# Patient Record
Sex: FEMALE | Race: OTHER
[De-identification: names, ages, dates, MRNs, and addresses within clinical notes are randomized per-mention and may not be internally consistent; named-entity substitution may affect disease eponyms.]

---

## 2019-09-30 ENCOUNTER — HOSPITAL ENCOUNTER (EMERGENCY)
Dept: HOSPITAL 74 - JER | Age: 64
LOS: 1 days | Discharge: HOME | End: 2019-10-01
Payer: SELF-PAY

## 2019-09-30 VITALS — BODY MASS INDEX: 21.5 KG/M2

## 2019-09-30 VITALS — TEMPERATURE: 97.3 F

## 2019-09-30 DIAGNOSIS — Z86.73: ICD-10-CM

## 2019-09-30 DIAGNOSIS — E04.9: ICD-10-CM

## 2019-09-30 DIAGNOSIS — T78.1XXA: Primary | ICD-10-CM

## 2019-09-30 DIAGNOSIS — H02.849: ICD-10-CM
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
## 2019-09-30 DIAGNOSIS — Z87.09: ICD-10-CM

## 2019-09-30 DIAGNOSIS — Z88.0: ICD-10-CM

## 2019-09-30 DIAGNOSIS — Z21: ICD-10-CM

## 2019-09-30 PROCEDURE — 3E023GC INTRODUCTION OF OTHER THERAPEUTIC SUBSTANCE INTO MUSCLE, PERCUTANEOUS APPROACH: ICD-10-PCS

## 2019-09-30 PROCEDURE — 3E0333Z INTRODUCTION OF ANTI-INFLAMMATORY INTO PERIPHERAL VEIN, PERCUTANEOUS APPROACH: ICD-10-PCS

## 2019-09-30 NOTE — PDOC
History of Present Illness





- General


Chief Complaint: Allergic Reaction


Stated Complaint: ALLERGIC REACTION


Time Seen by Provider: 09/30/19 23:49


History Source: Patient





- History of Present Illness


Initial Comments: 





10/01/19 03:30


Ms. Reina is a 65 y/o woman with hx fibroids, HIV (undetectable, followed by 

Dr. Malagon) presenting with an allergic reaction. She reports eating a miniature 

[employer] candy bar at home when she developed upper lip swelling, 

eyelid swelling, and hives on her bilateral upper extremities. She denies any 

shortness of breath or difficulty breathing. At home she took 50 mg benadryl, 

and presented to the ED when her symptoms did not resolve. She denies any known 

food allergies, and has had no recent changes in medications. She denies any 

nausea, vomiting, chills, or pain anywhere.  





Past History





- Past Medical History


Allergies/Adverse Reactions: 


 Allergies











Allergy/AdvReac Type Severity Reaction Status Date / Time


 


Penicillins Allergy   Verified 10/01/19 01:50











Home Medications: 


Ambulatory Orders





Emtricitab/Rilpivirine/Tenofov [Complera Tablet -] 1 each PO DAILY #30 tablet 09 /04/18 


Levothyroxine [Synthroid -] 150 mcg PO DAILY #30 tablet 09/04/18 


Cefuroxime Axetil [Ceftin -] 500 mg PO Q12H #14 tablet 01/25/19 


Emtricitab/Rilpivirine/Tenofov [Complera -] 1 each PO DAILY #30 tablet 03/12/19 


Levothyroxine [Synthroid -] 150 mcg PO DAILY #30 tablet 03/12/19 








Anemia: No


Asthma: Yes (1 episode)


Cancer: No


Cardiac Disorders: No


CVA: Yes (11/2015 hosp'd x 2 days, had slurred speech, all resolved)


COPD: No


CHF: No


Dementia: No


Diabetes: No


GI Disorders: No


 Disorders: No


HTN: No


Hypercholesterolemia: No


Liver Disease: No


Seizures: No


Thyroid Disease: Yes (hx goiter, stable on meds)





- Psycho Social/Smoking Cessation Hx


Smoking History: Never smoked


Have you smoked in the past 12 months: No


Number of Cigarettes Smoked Daily: 0


If you are a former smoker, when did you quit?: over 40 years ago


Hx Alcohol Use: Yes (infrequent social )


Drug/Substance Use Hx: No


Hx Substance Use Treatment: No





**Review of Systems





- Review of Systems


Able to Perform ROS?: Yes


Comments:: 





10/01/19 03:35


ROS: 


GENERAL/CONSTITUTIONAL: No fever or chills. No weakness.


HEAD, EYES, EARS, NOSE AND THROAT: Bilateral eyelid swelling. Upper lip 

swelling. No change in vision. No ear pain or discharge. No sore throat.


CARDIOVASCULAR: No chest pain or shortness of breath


RESPIRATORY: No cough, wheezing, or hemoptysis.


GASTROINTESTINAL: No nausea, vomiting, diarrhea or constipation.


GENITOURINARY: No dysuria, frequency, or change in urination.


MUSCULOSKELETAL: No joint or muscle swelling or pain. No neck or back pain.


SKIN: Hives on bilateral upper extremities. 


NEUROLOGIC: No headache, vertigo, loss of consciousness, or change in strength/

sensation.


ENDOCRINE: No increased thirst. No abnormal weight change


HEMATOLOGIC/LYMPHATIC: No anemia, easy bleeding, or history of blood clots.


ALLERGIC/IMMUNOLOGIC: Hives








*Physical Exam





- Physical Exam


Comments: 





10/01/19 03:37


PE: 


GENERAL: Awake, alert, and fully oriented, in no acute distress


HEAD: Upper lip swelling. No signs of trauma, normocephalic, atraumatic 


EYES: Bilateral eyelid swelling. PERRLA, EOMI, sclera anicteric, conjunctiva 

clear


ENT: Auricles normal inspection, hearing grossly normal, nares patent, 

oropharynx clear without exudates. Moist mucosa


NECK: Normal ROM, supple, no lymphadenopathy, JVD, or masses


LUNGS: No distress, speaks full sentences, clear to auscultation bilaterally 


HEART: Regular rate and rhythm, normal S1 and S2, no murmurs, rubs or gallops, 

peripheral pulses normal and equal bilaterally. 


ABDOMEN: Soft, nontender, normoactive bowel sounds.  No guarding, no rebound.  

No masses


EXTREMITIES : Normal inspection, Normal range of motion, no edema.  No clubbing 

or cyanosis


NEUROLOGICAL: Cranial nerves II through XII grossly intact.  Normal speech, 

normal gait, no focal sensorimotor deficits 


SKIN: Mild hives appreciated on bilateral forearms. Warm, Dry, normal turgor.








ED Treatment Course





- Medications


Given in the ED: 


ED Medications














Discontinued Medications














Generic Name Dose Route Start Last Admin





  Trade Name Freq  PRN Reason Stop Dose Admin


 


Methylprednisolone Sodium Succinate  125 mg  09/30/19 23:49  09/30/19 23:51





  Solu-Medrol -  IVPUSH  09/30/19 23:50  125 mg





  ONCE ONE   Administration





     





     





     





     














Medical Decision Making





- Medical Decision Making





10/01/19 03:39


64 F p/w allergic reaction after ingesting candy bar, no difficulty breathing 

or airway compromise, consistent with allergic reaction. Took 50 mg benadryl at 

home before presenting to ED. 





Plan: 


Epinephrine


Prednisone


Serial reassessment





Dispo: 


Home pending reassessment





---


Repeat physical exam - lungs CTAB, swelling significantly improved.


Plan for discharge home, close PCP follow up.








Discharge





- Discharge Information


Problems reviewed: Yes


Clinical Impression/Diagnosis: 


Allergic reaction


Qualifiers:


 Encounter type: initial encounter Qualified Code(s): T78.40XA - Allergy, 

unspecified, initial encounter





Condition: Stable


Disposition: HOME





- Admission


No





- Follow up/Referral





- Patient Discharge Instructions


Patient Printed Discharge Instructions:  DI for General Allergic Reactions


Additional Instructions: 


You were seen in the emergency department after an allergic reaction. We gave 

epinephrine and steroids to help your symptoms, and the swelling improved. 

Please follow up with your primary care provider as soon as possible, in the 

next seven days. Please return to the emergency department if you develop 

difficulty breathing, chest pain, or if you develop any other symptoms that are 

concerning to you. 





- Post Discharge Activity


Work/Back to School Note:  Back to Work

## 2019-10-01 VITALS — SYSTOLIC BLOOD PRESSURE: 135 MMHG | HEART RATE: 70 BPM | DIASTOLIC BLOOD PRESSURE: 79 MMHG

## 2019-10-01 NOTE — PDOC
Documentation entered by Ángela Fitch SCRIBE, acting as scribe for Giselle Nuñez MD.








Giselle Nuñez MD:  This documentation has been prepared by the Little jose Brenda, SCRIBE, under my direction and personally reviewed by me in its 

entirety.  I confirm that the documentation accurately reflects all work, 

treatment, procedures, and medical decision making performed by me.  





Attending Attestation





- Resident


Resident Name: Aristeo Bledsoe





- ED Attending Attestation


I have performed the following: I have examined & evaluated the patient, The 

case was reviewed & discussed with the resident, I agree w/resident's findings 

& plan, Exceptions are as noted





- HPI


HPI: 





09/30/19 23:55


 


The patient is a 64 year old female, with a significant PMH of fibroids, goiter 

and now hypothyroidism, who presents to the emergency department with swollen 

lips and eyelids, which she notes to be an allergic reaction. Patient says that 

she does not have any known allergies, and the last thing she ate was a 

chocolate bar. She also notes taking 50 of benadryl. 





The patient denies chest pain, shortness of breath, headache and dizziness. 

Denies fever, chills, nausea, vomiting, diarrhea and constipation. Denies 

dysuria, frequency, urgency and hematuria.





Allergies: Penicillin 


Past surgical history: Partial hysterectomy, tonsillectomy and partial 

thyroidectomy 


Social history: Social alcohol use


PCP: Remington Carnes














- Physicial Exam


PE: 





09/30/19 23:58





GENERAL: 


65 yo female p/w maxillary lip angioedema, b/l eyelid swelling and hives


HEENT: 


Normocephalic, atraumatic. PERRL, EOM intact.


CARDIOVASCULAR: 


Normal S1, S2. Regular rate and rhythm.


PULMONARY: 


Clear to auscultation bilaterally.


ABDOMEN: 


Soft, non-distended, non-tender. 


EXTREMITIES: 


Normal ROM in all four extremities. No gross deformities.


SKIN: (+)  scattered hives


Warm, dry.  


NEUROLOGICAL: 


No focal neurological deficits.





10/01/19 00:14








- Medical Decision Making





10/01/19 00:16


65 yo female who ate a chocolate bar and then dev facial swelling and hives . 

She took benadryl 50 mg po at home prior to arrival. She is not aware of any 

food allergies ,she is allergic to penicillin





PMH  HIV>20 years ,goiter and had subtotal thyroidectomy


meds she takes complera and she has been on this med for years





lungs are cta b/l





imp allergies





plan  pt received IV steroids and epi /bendryl  at home, will reassess





10/01/19 02:33


pt feels much better


She never felt her throat was  closing and never had any wheezing 


d/c home

## 2020-08-04 ENCOUNTER — HOSPITAL ENCOUNTER (EMERGENCY)
Dept: HOSPITAL 74 - JER | Age: 65
Discharge: HOME | End: 2020-08-04
Payer: COMMERCIAL

## 2020-08-04 VITALS — DIASTOLIC BLOOD PRESSURE: 76 MMHG | SYSTOLIC BLOOD PRESSURE: 120 MMHG | HEART RATE: 80 BPM

## 2020-08-04 VITALS — BODY MASS INDEX: 24.7 KG/M2

## 2020-08-04 VITALS — TEMPERATURE: 98.1 F

## 2020-08-04 DIAGNOSIS — S42.214A: Primary | ICD-10-CM

## 2020-08-04 DIAGNOSIS — S80.212A: ICD-10-CM

## 2020-08-04 PROCEDURE — 3E033GC INTRODUCTION OF OTHER THERAPEUTIC SUBSTANCE INTO PERIPHERAL VEIN, PERCUTANEOUS APPROACH: ICD-10-PCS

## 2020-08-04 PROCEDURE — 3E033KZ INTRODUCTION OF OTHER DIAGNOSTIC SUBSTANCE INTO PERIPHERAL VEIN, PERCUTANEOUS APPROACH: ICD-10-PCS

## 2020-08-04 NOTE — PDOC
Attending Attestation





- Resident


Resident Name: Elian Mendozaana





- ED Attending Attestation


I have performed the following: I have examined & evaluated the patient, The 

case was reviewed & discussed with the resident, I agree w/resident's findings &

plan





- HPI


HPI: 





08/04/20 19:37


see resident hpi





- Physicial Exam


PE: 





08/04/20 19:37


see resident exam





- Medical Decision Making





08/04/20 19:37


64-year-old female status post mechanical fall with pain to the right shoulder 

and abrasions to the left knee


X-ray shows a nondisplaced proximal humerus fracture involving the humeral head


nvm in tact


Plan for DC with sling and outpatient orthopedic follow-up


Patient does live with family members who can assist in regards to left knee 

patient states that her joint feels normal other than what she is calling 

abrasions and does not feel she needs an x-ray





Discharge





- Discharge Information


Problems reviewed: Yes


Clinical Impression/Diagnosis: 


 Humeral fracture, Abrasion, left knee, initial encounter








- Follow up/Referral





- Patient Discharge Instructions





- Post Discharge Activity

## 2020-08-04 NOTE — PDOC
History of Present Illness





<Hermilo Simpson - Last Filed: 08/04/20 20:52>





- General


History Source: Patient


Exam Limitations: No Limitations





- History of Present Illness


Initial Comments: 





08/04/20 20:34


HPI:


This is a 65 y/o female PMH HIV+ on Biktarvi (undetectable), CVA (2015) on ASA, 

hyperthyroid s/p thyroidectomy presenting the ED following a fall earlier today.

Per the patient, she mis-stepped entering a restaurant and fell forward onto her

right shoulder and knees. She denies hitting her head or LOC. She denies any 

preceding chest pain, SOB, lightheadedness, dizziness, numbnes or weakness. 





ROS:


GENERAL/CONSTITUTIONAL: No fever/chills. No weakness.


HEAD, EYES, EARS, NOSE AND THROAT: No change in vision. No ear pain or 

discharge. No sore throat.


CARDIOVASCULAR: No chest pain or shortness of breath.


RESPIRATORY: No cough, wheezing, or hemoptysis.


GASTROINTESTINAL: No nausea, vomiting, diarrhea or constipation.


GENITOURINARY: No dysuria, frequency, or change in urination.


MUSCULOSKELETAL: Yes right arm and shoulder pain


SKIN: Abrasion to left knee


NEUROLOGIC: No headache, loss of consciousness, or change in strength/sensation.


HEMATOLOGIC/LYMPHATIC: On ASA








PMH: CVA 2015, hyperthyroid s/p thyroidectomy, HIV +


PSx: Partial hysterectomy


Social Hx: Denied etoh, tobacco


Meds: Biktarvi, synthroid


Allergies: Penicillins





PE:


GENERAL: Awake, alert, and fully oriented. Patient is in bed, crying, holding 

her right arm. 


HEAD: No signs of trauma


EYES: PERRL, EOMI,


NECK: Normal ROM, supple, no lymphadenopathy, JVD, or masses


LUNGS: Breath sounds equal, clear to auscultation bilaterally.  No wheezes, and 

no crackles


HEART: Regular rate and rhythm, normal S1 and S2, no murmurs, rubs or gallops. 

Pulses present bilaterally in upper and lower extremities. 


ABDOMEN: Soft, nontender, normoactive bowel sounds.  No guarding, no rebound.  

No masses


EXTREMITIES: Limited ROM in right arm. ROM intact in other extremities. 


NEUROLOGICAL: Cranial nerves II through XII grossly intact.  Normal speech, 

normal gait. Strength and sensation intact bilaterally in upper and lower 

extremities. 


SKIN: Patient has abrasion to left knee. 





MDM:This is a 65 y/o female PMH HIV+ on Biktarvi undetectable, CVA (2015) on 

ASA, hyperthyroid s/p thyroidectomy presenting the ED following a fall earlier 

today. 





08/04/20 21:03


Patient had witnessed fall, no LOC, did not hit head


R. humeral fracture


- pulses intact in right hand


- No numbness or weakness in r. hand





- Patients right arm placed in sling


- Fentanyl and IV acetaminophen in ED, naproxen at home





- Patient understands return precautions





<Nani Mendoza - Last Filed: 08/04/20 21:06>





- General


Chief Complaint: Injury


Stated Complaint: FALL


Time Seen by Provider: 08/04/20 19:18





Past History





<Hermilo Simpson - Last Filed: 08/04/20 20:52>





- Medical History


Anemia: No


Asthma: Yes (1 episode)


Cancer: No


Cardiac Disorders: No


CVA: Yes (11/2015 hosp'd x 2 days, had slurred speech, all resolved)


COPD: No


CHF: No


Dementia: No


Diabetes: No


GI Disorders: No


 Disorders: No


HTN: No


Hypercholesterolemia: No


Liver Disease: No


Seizures: No


Thyroid Disease: Yes (hx goiter, stable on meds)





- Reproductive History


Is Patient Pregnant Now?: No





- Psycho-Social/Smoking History


Smoking History: Never smoked


Have you smoked in the past 12 months: No


Number of Cigarettes Smoked Daily: 0


If you are a former smoker, when did you quit?: over 40 years ago


Information on smoking cessation initiated: No





- Substance Abuse Hx (Audit-C & DAST Scrn)


How often the patient has a drink containing alcohol: Never


Score: In Men: 4 or > Positive; In Women: 3 or > Positive: 0


Screen Result (Pos requires Nsg. Audit-10AR): Negative


In the last yr the pt used illegal drug/Rx for NonMed reason: No


Score:  Yes response is considered Positive: 0


Screen Result (Positive result requires Nsg. DAST-10): Negative





<Nani Mendoza - Last Filed: 08/04/20 21:06>





- Medical History


Allergies/Adverse Reactions: 


                                    Allergies











Allergy/AdvReac Type Severity Reaction Status Date / Time


 


Penicillins Allergy   Verified 08/04/20 19:01











Home Medications: 


Ambulatory Orders





Emtricitab/Rilpivirine/Tenofov [Complera Tablet -] 1 each PO DAILY #30 tablet 

09/04/18 


Levothyroxine [Synthroid -] 150 mcg PO DAILY #30 tablet 09/04/18 


Cefuroxime Axetil [Ceftin -] 500 mg PO Q12H #14 tablet 01/25/19 


Emtricitab/Rilpivirine/Tenofov [Complera -] 1 each PO DAILY #30 tablet 03/12/19 


Levothyroxine [Synthroid -] 150 mcg PO DAILY #30 tablet 03/12/19 


Methylprednisolone [Medrol Dose [place]] 4 mg PO ASDIR #21 tablet 10/01/19 


Naproxen 500 mg PO BID 7 Days #14 tablet 08/04/20 











*Physical Exam





- Vital Signs


                                Last Vital Signs











Temp Pulse Resp BP Pulse Ox


 


 98.1 F   84   16   122/80   96 


 


 08/04/20 18:40  08/04/20 18:40  08/04/20 18:40  08/04/20 18:40  08/04/20 18:40














<Hermilo Simpson - Last Filed: 08/04/20 20:52>





- Vital Signs


                                Last Vital Signs











Temp Pulse Resp BP Pulse Ox


 


 98.1 F   84   16   122/80   96 


 


 08/04/20 18:40  08/04/20 18:40  08/04/20 18:40  08/04/20 18:40  08/04/20 18:40














<Nani Mendoza - Last Filed: 08/04/20 21:06>





ED Treatment Course





- Medications


Given in the ED: 


ED Medications














Discontinued Medications














Generic Name Dose Route Start Last Admin





  Trade Name Phillip  PRN Reason Stop Dose Admin


 


Acetaminophen  1,000 mg  08/04/20 19:13  08/04/20 19:32





  Ofirmev Injection -  IVPB  08/04/20 19:14  1,000 mg





  ONCE ONE   Administration


 


Fentanyl  50 mcg  08/04/20 19:13  08/04/20 19:32





  Sublimaze Injection -  IVPUSH  08/04/20 19:14  50 mcg





  ONCE ONE   Administration














<Hermilo Simpson - Last Filed: 08/04/20 20:52>





Discharge





<Hermilo Simpson - Last Filed: 08/04/20 20:52>





- Discharge Information


Problems reviewed: Yes





- Admission


No





<Nani Mendoza - Last Filed: 08/04/20 21:06>





- Discharge Information


Clinical Impression/Diagnosis: 


 Abrasion, left knee, initial encounter





Humeral fracture


Qualifiers:


 Encounter type: initial encounter Humerus Location: surgical neck Fracture 

type: closed Fracture morphology: unspecified fracture morphology Fracture 

alignment: nondisplaced Laterality: right Qualified Code(s): S42.214A - 

Unspecified nondisplaced fracture of surgical neck of right humerus, initial 

encounter for closed fracture





Condition: Stable


Disposition: HOME





- Additional Discharge Information


Prescriptions: 


Naproxen 500 mg PO BID 7 Days #14 tablet





- Follow up/Referral


Referrals: 


Vikas Massey MD [Staff Physician] - 


Clement Arroyo MD [Staff Physician] - 





- Patient Discharge Instructions


Patient Printed Discharge Instructions:  How to Use a Sling


Additional Instructions: 








Please return to the ED with any new or concerning symptoms. Please return if 

you experience weakness, tingling, or numbness in your extremity. 





We gave you a referral for two orthopedists.


Please follow-up with orthopedics in the next 2-3 days.





I have sent a prescription for a pain medication called Naproxen to your 

pharmacy. Take as directed on the package insert. Do not exceed the recommended 

dosage. Do not take with other NSAID medications such as Ibuprofen, Advil, or 

Motrin.





Start with taking the Naproxen every 12 hours. If the pain becomes worse then 

add Tylenol (Acetaminophen) between doses of Naproxen. An example schedule is as

follows:





9:00 am Naproxen


12:00 pm 1000mg Tylenol


6:00 pm 1000mg Tylenol


9:00 pm Naproxen


12:00 am 1000mg Tylenol


etc.








- Post Discharge Activity


Work/Back to School Note:  Back to Work

## 2022-07-21 ENCOUNTER — HOSPITAL ENCOUNTER (EMERGENCY)
Dept: HOSPITAL 74 - JERFT | Age: 67
Discharge: HOME | End: 2022-07-21
Payer: COMMERCIAL

## 2022-07-21 VITALS — TEMPERATURE: 98.3 F | SYSTOLIC BLOOD PRESSURE: 141 MMHG | HEART RATE: 73 BPM | DIASTOLIC BLOOD PRESSURE: 80 MMHG

## 2022-07-21 VITALS — BODY MASS INDEX: 22.8 KG/M2

## 2022-07-21 DIAGNOSIS — S91.012A: Primary | ICD-10-CM

## 2022-07-21 PROCEDURE — 0HQKXZZ REPAIR RIGHT LOWER LEG SKIN, EXTERNAL APPROACH: ICD-10-PCS

## 2022-07-21 PROCEDURE — 3E0234Z INTRODUCTION OF SERUM, TOXOID AND VACCINE INTO MUSCLE, PERCUTANEOUS APPROACH: ICD-10-PCS

## 2022-07-31 ENCOUNTER — HOSPITAL ENCOUNTER (EMERGENCY)
Dept: HOSPITAL 74 - JER | Age: 67
Discharge: HOME | End: 2022-07-31
Payer: COMMERCIAL

## 2022-07-31 VITALS
HEART RATE: 72 BPM | SYSTOLIC BLOOD PRESSURE: 129 MMHG | TEMPERATURE: 98.6 F | DIASTOLIC BLOOD PRESSURE: 74 MMHG | RESPIRATION RATE: 19 BRPM

## 2022-07-31 VITALS — BODY MASS INDEX: 22.6 KG/M2

## 2022-07-31 DIAGNOSIS — Z48.02: Primary | ICD-10-CM

## 2022-10-03 ENCOUNTER — HOSPITAL ENCOUNTER (EMERGENCY)
Dept: HOSPITAL 74 - JER | Age: 67
Discharge: HOME | End: 2022-10-03
Payer: COMMERCIAL

## 2022-10-03 VITALS
DIASTOLIC BLOOD PRESSURE: 78 MMHG | HEART RATE: 55 BPM | SYSTOLIC BLOOD PRESSURE: 146 MMHG | RESPIRATION RATE: 18 BRPM | TEMPERATURE: 97.8 F

## 2022-10-03 VITALS — BODY MASS INDEX: 23 KG/M2

## 2022-10-03 DIAGNOSIS — Z71.3: Primary | ICD-10-CM

## 2022-10-31 ENCOUNTER — HOSPITAL ENCOUNTER (OUTPATIENT)
Dept: HOSPITAL 74 - JER | Age: 67
Setting detail: OBSERVATION
Discharge: LEFT BEFORE DISCHARGE | End: 2022-10-31
Attending: INTERNAL MEDICINE | Admitting: INTERNAL MEDICINE
Payer: COMMERCIAL

## 2022-10-31 VITALS
HEART RATE: 66 BPM | TEMPERATURE: 98.1 F | DIASTOLIC BLOOD PRESSURE: 83 MMHG | SYSTOLIC BLOOD PRESSURE: 145 MMHG | RESPIRATION RATE: 18 BRPM

## 2022-10-31 VITALS — BODY MASS INDEX: 23.6 KG/M2

## 2022-10-31 DIAGNOSIS — I10: ICD-10-CM

## 2022-10-31 DIAGNOSIS — E11.9: ICD-10-CM

## 2022-10-31 DIAGNOSIS — Z86.16: ICD-10-CM

## 2022-10-31 DIAGNOSIS — R06.02: ICD-10-CM

## 2022-10-31 DIAGNOSIS — B20: Primary | ICD-10-CM

## 2022-10-31 DIAGNOSIS — E78.5: ICD-10-CM

## 2022-10-31 LAB
ALBUMIN SERPL-MCNC: 3.8 G/DL (ref 3.4–5)
ALP SERPL-CCNC: 115 U/L (ref 45–117)
ALT SERPL-CCNC: 21 U/L (ref 13–61)
ANION GAP SERPL CALC-SCNC: 7 MMOL/L (ref 8–16)
AST SERPL-CCNC: 18 U/L (ref 15–37)
BASOPHILS # BLD: 1.4 % (ref 0–2)
BILIRUB SERPL-MCNC: 0.9 MG/DL (ref 0.2–1)
BUN SERPL-MCNC: 23.9 MG/DL (ref 7–18)
CALCIUM SERPL-MCNC: 9 MG/DL (ref 8.5–10.1)
CHLORIDE SERPL-SCNC: 106 MMOL/L (ref 98–107)
CO2 SERPL-SCNC: 27 MMOL/L (ref 21–32)
CREAT SERPL-MCNC: 0.9 MG/DL (ref 0.55–1.3)
DEPRECATED RDW RBC AUTO: 12.7 % (ref 11.6–15.6)
EOSINOPHIL # BLD: 7.5 % (ref 0–4.5)
GLUCOSE SERPL-MCNC: 109 MG/DL (ref 74–106)
HCT VFR BLD CALC: 39.5 % (ref 32.4–45.2)
HGB BLD-MCNC: 13.8 GM/DL (ref 10.7–15.3)
LYMPHOCYTES # BLD: 24.1 % (ref 8–40)
MCH RBC QN AUTO: 35.6 PG (ref 25.7–33.7)
MCHC RBC AUTO-ENTMCNC: 35 G/DL (ref 32–36)
MCV RBC: 101.7 FL (ref 80–96)
MONOCYTES # BLD AUTO: 11.5 % (ref 3.8–10.2)
NEUTROPHILS # BLD: 55.5 % (ref 42.8–82.8)
PLATELET # BLD AUTO: 217 10^3/UL (ref 134–434)
PMV BLD: 8.6 FL (ref 7.5–11.1)
PROT SERPL-MCNC: 7.5 G/DL (ref 6.4–8.2)
RBC # BLD AUTO: 3.88 M/MM3 (ref 3.6–5.2)
SODIUM SERPL-SCNC: 140 MMOL/L (ref 136–145)
WBC # BLD AUTO: 6.9 K/MM3 (ref 4–10)

## 2022-10-31 PROCEDURE — G0378 HOSPITAL OBSERVATION PER HR: HCPCS

## 2022-12-28 ENCOUNTER — OFFICE (OUTPATIENT)
Dept: URBAN - METROPOLITAN AREA CLINIC 121 | Facility: CLINIC | Age: 67
Setting detail: OPHTHALMOLOGY
End: 2022-12-28
Payer: COMMERCIAL

## 2022-12-28 DIAGNOSIS — H40.013: ICD-10-CM

## 2022-12-28 DIAGNOSIS — H43.811: ICD-10-CM

## 2022-12-28 DIAGNOSIS — H25.13: ICD-10-CM

## 2022-12-28 DIAGNOSIS — H43.391: ICD-10-CM

## 2022-12-28 DIAGNOSIS — E11.9: ICD-10-CM

## 2022-12-28 PROCEDURE — 92250 FUNDUS PHOTOGRAPHY W/I&R: CPT | Performed by: OPHTHALMOLOGY

## 2022-12-28 PROCEDURE — 92004 COMPRE OPH EXAM NEW PT 1/>: CPT | Performed by: OPHTHALMOLOGY

## 2022-12-28 PROCEDURE — 76514 ECHO EXAM OF EYE THICKNESS: CPT | Performed by: OPHTHALMOLOGY

## 2022-12-28 ASSESSMENT — REFRACTION_CURRENTRX
OS_CYLINDER: +2.00
OS_AXIS: 069
OD_OVR_VA: 20/
OD_AXIS: 164
OS_SPHERE: -3.25
OD_CYLINDER: +0.25
OS_OVR_VA: 20/
OD_SPHERE: -2.75

## 2022-12-28 ASSESSMENT — VISUAL ACUITY
OS_BCVA: 20/60
OD_BCVA: 20/60

## 2022-12-28 ASSESSMENT — REFRACTION_AUTOREFRACTION
OD_AXIS: 170
OD_SPHERE: -4.50
OD_CYLINDER: +1.00
OS_AXIS: 076
OS_SPHERE: -3.75
OS_CYLINDER: +0.50

## 2022-12-28 ASSESSMENT — REFRACTION_MANIFEST
OD_CYLINDER: +1.00
OS_CYLINDER: +0.50
OD_AXIS: 170
OS_AXIS: 076
OS_SPHERE: -3.75
OD_SPHERE: -4.50

## 2022-12-28 ASSESSMENT — SPHEQUIV_DERIVED
OD_SPHEQUIV: -4
OD_SPHEQUIV: -4
OS_SPHEQUIV: -3.5
OS_SPHEQUIV: -3.5

## 2022-12-28 ASSESSMENT — KERATOMETRY
METHOD_AUTO_MANUAL: AUTO
OD_K1POWER_DIOPTERS: 43.50
OS_K2POWER_DIOPTERS: 44.75
OS_K1POWER_DIOPTERS: 42.00
OD_K2POWER_DIOPTERS: 44.25
OS_AXISANGLE_DEGREES: 076
OD_AXISANGLE_DEGREES: 056

## 2022-12-28 ASSESSMENT — PACHYMETRY
OD_CT_CORRECTION: -1
OS_CT_UM: 566
OS_CT_CORRECTION: -1
OD_CT_UM: 561

## 2022-12-28 ASSESSMENT — CONFRONTATIONAL VISUAL FIELD TEST (CVF)
OS_FINDINGS: FULL
OD_FINDINGS: FULL

## 2022-12-28 ASSESSMENT — TONOMETRY
OD_IOP_MMHG: 19
OS_IOP_MMHG: 19

## 2022-12-28 ASSESSMENT — AXIALLENGTH_DERIVED
OD_AL: 25.1036
OS_AL: 25.091
OS_AL: 25.091
OD_AL: 25.1036

## 2023-02-03 ENCOUNTER — OFFICE (OUTPATIENT)
Dept: URBAN - METROPOLITAN AREA CLINIC 121 | Facility: CLINIC | Age: 68
Setting detail: OPHTHALMOLOGY
End: 2023-02-03
Payer: COMMERCIAL

## 2023-02-03 DIAGNOSIS — H25.12: ICD-10-CM

## 2023-02-03 DIAGNOSIS — H43.391: ICD-10-CM

## 2023-02-03 DIAGNOSIS — H40.013: ICD-10-CM

## 2023-02-03 DIAGNOSIS — E11.9: ICD-10-CM

## 2023-02-03 DIAGNOSIS — H25.13: ICD-10-CM

## 2023-02-03 DIAGNOSIS — H43.811: ICD-10-CM

## 2023-02-03 PROBLEM — H25.11 CATARACT SENILE NUCLEAR SCLEROSIS; RIGHT EYE, LEFT EYE, BOTH EYES: Status: ACTIVE | Noted: 2023-02-03

## 2023-02-03 PROCEDURE — 92136 OPHTHALMIC BIOMETRY: CPT | Performed by: OPHTHALMOLOGY

## 2023-02-03 PROCEDURE — 99214 OFFICE O/P EST MOD 30 MIN: CPT | Performed by: OPHTHALMOLOGY

## 2023-02-03 PROCEDURE — 92134 CPTRZ OPH DX IMG PST SGM RTA: CPT | Performed by: OPHTHALMOLOGY

## 2023-02-03 ASSESSMENT — KERATOMETRY
OD_AXISANGLE2_DEGREES: 090
OD_CYLAXISANGLE_DEGREES: 180
OS_K1POWER_DIOPTERS: 42.25
OS_K1K2_AVERAGE: 43.5
METHOD_AUTO_MANUAL: AUTO
OD_K1POWER_DIOPTERS: 43.50
OD_K2POWER_DIOPTERS: 43.50
OS_CYLAXISANGLE_DEGREES: 079
OD_AXISANGLE_DEGREES: 090
OS_K2POWER_DIOPTERS: 44.75
OS_CYLPOWER_DEGREES: 2.5
OS_K1POWER_DIOPTERS: 42.25
OD_K2POWER_DIOPTERS: 43.50
OD_K1K2_AVERAGE: 43.5
OS_AXISANGLE_DEGREES: 169
OD_K1POWER_DIOPTERS: 43.50
OS_AXISANGLE2_DEGREES: 079
OS_AXISANGLE_DEGREES: 079
OD_AXISANGLE_DEGREES: 180
OS_K2POWER_DIOPTERS: 44.75

## 2023-02-03 ASSESSMENT — REFRACTION_CURRENTRX
OD_AXIS: 164
OS_OVR_VA: 20/
OD_SPHERE: -2.75
OS_SPHERE: -3.25
OD_CYLINDER: +0.25
OS_CYLINDER: +2.00
OD_OVR_VA: 20/
OS_AXIS: 069

## 2023-02-03 ASSESSMENT — PACHYMETRY
OS_CT_CORRECTION: -1
OD_CT_UM: 561
OS_CT_UM: 566
OD_CT_CORRECTION: -1

## 2023-02-03 ASSESSMENT — SPHEQUIV_DERIVED
OS_SPHEQUIV: -2
OS_SPHEQUIV: -3.5
OD_SPHEQUIV: -3.75
OD_SPHEQUIV: -4

## 2023-02-03 ASSESSMENT — REFRACTION_MANIFEST
OD_SPHERE: -4.50
OS_SPHERE: -3.75
OD_AXIS: 170
OS_AXIS: 076
OS_CYLINDER: +0.50
OD_CYLINDER: +1.00

## 2023-02-03 ASSESSMENT — AXIALLENGTH_DERIVED
OS_AL: 24.3977
OD_AL: 25.2605
OS_AL: 25.0392
OD_AL: 25.1494

## 2023-02-03 ASSESSMENT — REFRACTION_AUTOREFRACTION
OD_CYLINDER: +1.50
OD_SPHERE: -4.50
OD_AXIS: 008
OS_AXIS: 070
OS_SPHERE: -4.25
OS_CYLINDER: +4.50

## 2023-02-03 ASSESSMENT — TONOMETRY: OS_IOP_MMHG: 17

## 2023-02-03 ASSESSMENT — VISUAL ACUITY
OD_BCVA: 20/60
OS_BCVA: 20/60

## 2023-02-03 ASSESSMENT — CONFRONTATIONAL VISUAL FIELD TEST (CVF)
OS_FINDINGS: FULL
OD_FINDINGS: FULL